# Patient Record
Sex: FEMALE
[De-identification: names, ages, dates, MRNs, and addresses within clinical notes are randomized per-mention and may not be internally consistent; named-entity substitution may affect disease eponyms.]

---

## 2024-09-04 ENCOUNTER — CARE COORDINATION (OUTPATIENT)
Dept: OTHER | Facility: CLINIC | Age: 74
End: 2024-09-04

## 2024-09-04 NOTE — CARE COORDINATION
Ambulatory Care Coordination Note     9/4/2024 3:14 PM     Patient outreach attempt by this ACM today to offer care management services. ACM was unable to reach the patient by telephone today; left voice message requesting a return phone call to this ACM.     ACM: Lorene Bang RN       Follow Up:   Plan for next ACM outreach in approximately  2-5 business days  to complete:  - outreach attempt to offer care management services.          Lorene Bang RN   Ambulatory Care Manager  Cell [phone]  [email or web address]

## 2024-09-10 ENCOUNTER — CARE COORDINATION (OUTPATIENT)
Dept: OTHER | Facility: CLINIC | Age: 74
End: 2024-09-10

## 2024-09-17 ENCOUNTER — CARE COORDINATION (OUTPATIENT)
Dept: OTHER | Facility: CLINIC | Age: 74
End: 2024-09-17